# Patient Record
Sex: FEMALE | Race: WHITE
[De-identification: names, ages, dates, MRNs, and addresses within clinical notes are randomized per-mention and may not be internally consistent; named-entity substitution may affect disease eponyms.]

---

## 2017-03-22 DIAGNOSIS — E55.9 VITAMIN D DEFICIENCY: ICD-10-CM

## 2017-03-22 RX ORDER — ACETAMINOPHEN 160 MG
TABLET,DISINTEGRATING ORAL
Qty: 100 CAPSULE | Refills: 0 | Status: SHIPPED | OUTPATIENT
Start: 2017-03-22 | End: 2017-07-01

## 2017-03-22 NOTE — TELEPHONE ENCOUNTER
cholecalciferol 2000 UNITS CAPS      Last Written Prescription Date: 3/7/16  Last Fill Quantity: 100,  # refills: PRN   Last Office Visit with [facility], [facility] or [facility] prescribing provider: 3/7/16

## 2017-07-01 DIAGNOSIS — E55.9 VITAMIN D DEFICIENCY: ICD-10-CM

## 2017-07-03 NOTE — TELEPHONE ENCOUNTER
Vitamin D-3      Last Written Prescription Date: 03/22/17  Last Fill Quantity: 100,  # refills: 0   Last Office Visit with [facility], [facility] or [facility] prescribing provider: 03/07/16

## 2017-07-05 RX ORDER — ACETAMINOPHEN 160 MG
TABLET,DISINTEGRATING ORAL
Qty: 100 CAPSULE | Refills: 1 | Status: SHIPPED | OUTPATIENT
Start: 2017-07-05

## 2017-07-05 NOTE — TELEPHONE ENCOUNTER
Routing refill request to provider for review/approval because:  Sofía given x1 and patient did not follow up, please advise  Patient needs to be seen because it has been more than 1 year since last office visit.

## 2018-03-24 ENCOUNTER — HEALTH MAINTENANCE LETTER (OUTPATIENT)
Age: 23
End: 2018-03-24

## 2020-02-10 ENCOUNTER — HEALTH MAINTENANCE LETTER (OUTPATIENT)
Age: 25
End: 2020-02-10